# Patient Record
Sex: MALE | Race: WHITE | NOT HISPANIC OR LATINO | Employment: OTHER | URBAN - NONMETROPOLITAN AREA
[De-identification: names, ages, dates, MRNs, and addresses within clinical notes are randomized per-mention and may not be internally consistent; named-entity substitution may affect disease eponyms.]

---

## 2018-12-15 ENCOUNTER — HISTORICAL (OUTPATIENT)
Dept: ADMINISTRATIVE | Facility: HOSPITAL | Age: 68
End: 2018-12-15

## 2020-08-24 ENCOUNTER — HISTORICAL (OUTPATIENT)
Dept: ADMINISTRATIVE | Facility: HOSPITAL | Age: 70
End: 2020-08-24

## 2023-06-26 ENCOUNTER — ANESTHESIA (OUTPATIENT)
Dept: GASTROENTEROLOGY | Facility: HOSPITAL | Age: 73
End: 2023-06-26
Payer: MEDICARE

## 2023-06-26 ENCOUNTER — ANESTHESIA EVENT (OUTPATIENT)
Dept: GASTROENTEROLOGY | Facility: HOSPITAL | Age: 73
End: 2023-06-26
Payer: MEDICARE

## 2023-06-26 ENCOUNTER — HOSPITAL ENCOUNTER (OUTPATIENT)
Facility: HOSPITAL | Age: 73
Discharge: HOME | End: 2023-06-26
Attending: FAMILY MEDICINE | Admitting: FAMILY MEDICINE
Payer: MEDICARE

## 2023-06-26 VITALS
HEART RATE: 97 BPM | WEIGHT: 162.81 LBS | TEMPERATURE: 99 F | OXYGEN SATURATION: 96 % | HEIGHT: 66 IN | BODY MASS INDEX: 26.17 KG/M2 | RESPIRATION RATE: 20 BRPM | DIASTOLIC BLOOD PRESSURE: 86 MMHG | SYSTOLIC BLOOD PRESSURE: 146 MMHG
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
## 2023-06-26 DIAGNOSIS — T17.208A FOREIGN BODY IN THROAT: ICD-10-CM

## 2023-06-26 DIAGNOSIS — T18.128A ESOPHAGEAL OBSTRUCTION DUE TO FOOD IMPACTION: Primary | ICD-10-CM

## 2023-06-26 LAB
ALBUMIN SERPL-MCNC: 4.8 G/DL (ref 3.4–5)
ALBUMIN/GLOB SERPL: 1.5 RATIO
ALP SERPL-CCNC: 72 UNIT/L (ref 50–144)
ALT SERPL-CCNC: 22 UNIT/L (ref 1–45)
ANION GAP SERPL CALC-SCNC: 5 MEQ/L (ref 2–13)
AST SERPL-CCNC: 38 UNIT/L (ref 17–59)
BASOPHILS # BLD AUTO: 0.04 X10(3)/MCL (ref 0.01–0.08)
BASOPHILS NFR BLD AUTO: 0.5 % (ref 0.1–1.2)
BILIRUBIN DIRECT+TOT PNL SERPL-MCNC: 0.9 MG/DL (ref 0–1)
BUN SERPL-MCNC: 18 MG/DL (ref 7–20)
CALCIUM SERPL-MCNC: 8.6 MG/DL (ref 8.4–10.2)
CHLORIDE SERPL-SCNC: 109 MMOL/L (ref 98–110)
CO2 SERPL-SCNC: 30 MMOL/L (ref 21–32)
CREAT SERPL-MCNC: 0.88 MG/DL (ref 0.66–1.25)
CREAT/UREA NIT SERPL: 20 (ref 12–20)
EOSINOPHIL # BLD AUTO: 0.42 X10(3)/MCL (ref 0.04–0.54)
EOSINOPHIL NFR BLD AUTO: 5.5 % (ref 0.7–7)
ERYTHROCYTE [DISTWIDTH] IN BLOOD BY AUTOMATED COUNT: 12.1 %
GFR SERPLBLD CREATININE-BSD FMLA CKD-EPI: >90 MLS/MIN/1.73/M2
GLOBULIN SER-MCNC: 3.1 GM/DL (ref 2–3.9)
GLUCOSE SERPL-MCNC: 98 MG/DL (ref 70–115)
HCT VFR BLD AUTO: 44.7 % (ref 36–52)
HGB BLD-MCNC: 15.5 G/DL (ref 13–18)
IMM GRANULOCYTES # BLD AUTO: 0.01 X10(3)/MCL (ref 0–0.03)
IMM GRANULOCYTES NFR BLD AUTO: 0.1 % (ref 0–0.5)
LYMPHOCYTES # BLD AUTO: 2.12 X10(3)/MCL (ref 1.32–3.57)
LYMPHOCYTES NFR BLD AUTO: 27.5 % (ref 20–55)
MCH RBC QN AUTO: 33.4 PG (ref 27–34)
MCHC RBC AUTO-ENTMCNC: 34.7 G/DL (ref 31–37)
MCV RBC AUTO: 96.3 FL (ref 79–99)
MONOCYTES # BLD AUTO: 0.4 X10(3)/MCL (ref 0.3–0.82)
MONOCYTES NFR BLD AUTO: 5.2 % (ref 4.7–12.5)
NEUTROPHILS # BLD AUTO: 4.71 X10(3)/MCL (ref 1.78–5.38)
NEUTROPHILS NFR BLD AUTO: 61.2 % (ref 37–73)
NRBC BLD AUTO-RTO: 0 %
PLATELET # BLD AUTO: 241 X10(3)/MCL (ref 140–371)
PMV BLD AUTO: 10.8 FL (ref 9.4–12.4)
POTASSIUM SERPL-SCNC: 4 MMOL/L (ref 3.5–5.1)
PROT SERPL-MCNC: 7.9 GM/DL (ref 6.3–8.2)
RBC # BLD AUTO: 4.64 X10(6)/MCL (ref 4–6)
SODIUM SERPL-SCNC: 144 MMOL/L (ref 135–145)
WBC # SPEC AUTO: 7.7 X10(3)/MCL (ref 4–11.5)

## 2023-06-26 PROCEDURE — D9220A PRA ANESTHESIA: Mod: ,,, | Performed by: NURSE ANESTHETIST, CERTIFIED REGISTERED

## 2023-06-26 PROCEDURE — G0378 HOSPITAL OBSERVATION PER HR: HCPCS

## 2023-06-26 PROCEDURE — 25000003 PHARM REV CODE 250: Performed by: SURGERY

## 2023-06-26 PROCEDURE — 99285 EMERGENCY DEPT VISIT HI MDM: CPT | Mod: 25

## 2023-06-26 PROCEDURE — C9113 INJ PANTOPRAZOLE SODIUM, VIA: HCPCS | Performed by: FAMILY MEDICINE

## 2023-06-26 PROCEDURE — 43239 EGD BIOPSY SINGLE/MULTIPLE: CPT | Performed by: SURGERY

## 2023-06-26 PROCEDURE — 25000003 PHARM REV CODE 250: Performed by: NURSE ANESTHETIST, CERTIFIED REGISTERED

## 2023-06-26 PROCEDURE — 25000003 PHARM REV CODE 250: Performed by: FAMILY MEDICINE

## 2023-06-26 PROCEDURE — 80053 COMPREHEN METABOLIC PANEL: CPT | Performed by: FAMILY MEDICINE

## 2023-06-26 PROCEDURE — 63600175 PHARM REV CODE 636 W HCPCS: Performed by: FAMILY MEDICINE

## 2023-06-26 PROCEDURE — 63600175 PHARM REV CODE 636 W HCPCS: Performed by: SURGERY

## 2023-06-26 PROCEDURE — 96374 THER/PROPH/DIAG INJ IV PUSH: CPT | Mod: 59

## 2023-06-26 PROCEDURE — 36415 COLL VENOUS BLD VENIPUNCTURE: CPT | Performed by: FAMILY MEDICINE

## 2023-06-26 PROCEDURE — D9220A PRA ANESTHESIA: ICD-10-PCS | Mod: ,,, | Performed by: NURSE ANESTHETIST, CERTIFIED REGISTERED

## 2023-06-26 PROCEDURE — 63600175 PHARM REV CODE 636 W HCPCS: Performed by: NURSE ANESTHETIST, CERTIFIED REGISTERED

## 2023-06-26 PROCEDURE — 99900035 HC TECH TIME PER 15 MIN (STAT)

## 2023-06-26 PROCEDURE — 85025 COMPLETE CBC W/AUTO DIFF WBC: CPT | Performed by: FAMILY MEDICINE

## 2023-06-26 RX ORDER — SODIUM CHLORIDE 9 MG/ML
INJECTION, SOLUTION INTRAVENOUS CONTINUOUS
Status: DISCONTINUED | OUTPATIENT
Start: 2023-06-26 | End: 2023-06-26 | Stop reason: HOSPADM

## 2023-06-26 RX ORDER — PANTOPRAZOLE SODIUM 40 MG/10ML
40 INJECTION, POWDER, LYOPHILIZED, FOR SOLUTION INTRAVENOUS DAILY
Status: DISCONTINUED | OUTPATIENT
Start: 2023-06-26 | End: 2023-06-26

## 2023-06-26 RX ORDER — ONDANSETRON 2 MG/ML
INJECTION INTRAMUSCULAR; INTRAVENOUS
Status: DISCONTINUED | OUTPATIENT
Start: 2023-06-26 | End: 2023-06-26 | Stop reason: HOSPADM

## 2023-06-26 RX ORDER — ONDANSETRON 2 MG/ML
4 INJECTION INTRAMUSCULAR; INTRAVENOUS EVERY 12 HOURS PRN
Status: DISCONTINUED | OUTPATIENT
Start: 2023-06-26 | End: 2023-06-26 | Stop reason: HOSPADM

## 2023-06-26 RX ORDER — HYDROMORPHONE HYDROCHLORIDE 1 MG/ML
0.5 INJECTION, SOLUTION INTRAMUSCULAR; INTRAVENOUS; SUBCUTANEOUS EVERY 6 HOURS PRN
Status: DISCONTINUED | OUTPATIENT
Start: 2023-06-26 | End: 2023-06-26 | Stop reason: HOSPADM

## 2023-06-26 RX ORDER — LIDOCAINE HYDROCHLORIDE 20 MG/ML
INJECTION INTRAVENOUS
Status: DISCONTINUED | OUTPATIENT
Start: 2023-06-26 | End: 2023-06-26 | Stop reason: HOSPADM

## 2023-06-26 RX ORDER — MIDAZOLAM HYDROCHLORIDE 1 MG/ML
2 INJECTION INTRAMUSCULAR; INTRAVENOUS
Status: COMPLETED | OUTPATIENT
Start: 2023-06-26 | End: 2023-06-26

## 2023-06-26 RX ORDER — DEXAMETHASONE SODIUM PHOSPHATE 4 MG/ML
INJECTION, SOLUTION INTRA-ARTICULAR; INTRALESIONAL; INTRAMUSCULAR; INTRAVENOUS; SOFT TISSUE
Status: DISCONTINUED | OUTPATIENT
Start: 2023-06-26 | End: 2023-06-26 | Stop reason: HOSPADM

## 2023-06-26 RX ORDER — PROPOFOL 10 MG/ML
VIAL (ML) INTRAVENOUS
Status: DISCONTINUED | OUTPATIENT
Start: 2023-06-26 | End: 2023-06-26 | Stop reason: HOSPADM

## 2023-06-26 RX ORDER — VECURONIUM BROMIDE FOR INJECTION 1 MG/ML
INJECTION, POWDER, LYOPHILIZED, FOR SOLUTION INTRAVENOUS
Status: DISCONTINUED | OUTPATIENT
Start: 2023-06-26 | End: 2023-06-26 | Stop reason: HOSPADM

## 2023-06-26 RX ORDER — ONDANSETRON 2 MG/ML
4 INJECTION INTRAMUSCULAR; INTRAVENOUS EVERY 6 HOURS PRN
Status: DISCONTINUED | OUTPATIENT
Start: 2023-06-26 | End: 2023-06-26

## 2023-06-26 RX ORDER — FENTANYL CITRATE 50 UG/ML
INJECTION, SOLUTION INTRAMUSCULAR; INTRAVENOUS
Status: DISCONTINUED | OUTPATIENT
Start: 2023-06-26 | End: 2023-06-26 | Stop reason: HOSPADM

## 2023-06-26 RX ORDER — PANTOPRAZOLE SODIUM 40 MG/10ML
40 INJECTION, POWDER, LYOPHILIZED, FOR SOLUTION INTRAVENOUS DAILY
Status: DISCONTINUED | OUTPATIENT
Start: 2023-06-26 | End: 2023-06-26 | Stop reason: HOSPADM

## 2023-06-26 RX ORDER — PREDNISOLONE ACETATE 10 MG/ML
1 SUSPENSION/ DROPS OPHTHALMIC EVERY 4 HOURS
Status: DISCONTINUED | OUTPATIENT
Start: 2023-06-26 | End: 2023-06-26 | Stop reason: HOSPADM

## 2023-06-26 RX ADMIN — FENTANYL CITRATE 100 MCG: 50 INJECTION, SOLUTION INTRAMUSCULAR; INTRAVENOUS at 07:06

## 2023-06-26 RX ADMIN — FENTANYL CITRATE 50 MCG: 50 INJECTION, SOLUTION INTRAMUSCULAR; INTRAVENOUS at 07:06

## 2023-06-26 RX ADMIN — SUGAMMADEX 200 MG: 100 INJECTION, SOLUTION INTRAVENOUS at 08:06

## 2023-06-26 RX ADMIN — GLYCOPYRROLATE 0.2 MG: 0.2 INJECTION, SOLUTION INTRAMUSCULAR; INTRAVITREAL at 07:06

## 2023-06-26 RX ADMIN — PROPOFOL 150 MG: 10 INJECTION, EMULSION INTRAVENOUS at 07:06

## 2023-06-26 RX ADMIN — PANTOPRAZOLE SODIUM 40 MG: 40 INJECTION, POWDER, FOR SOLUTION INTRAVENOUS at 01:06

## 2023-06-26 RX ADMIN — PREDNISOLONE ACETATE 1 DROP: 10 SUSPENSION/ DROPS OPHTHALMIC at 08:06

## 2023-06-26 RX ADMIN — PREDNISOLONE ACETATE 1 DROP: 10 SUSPENSION/ DROPS OPHTHALMIC at 05:06

## 2023-06-26 RX ADMIN — DEXAMETHASONE SODIUM PHOSPHATE 8 MG: 4 INJECTION, SOLUTION INTRA-ARTICULAR; INTRALESIONAL; INTRAMUSCULAR; INTRAVENOUS; SOFT TISSUE at 08:06

## 2023-06-26 RX ADMIN — LIDOCAINE HYDROCHLORIDE 75 MG: 20 INJECTION, SOLUTION INTRAVENOUS at 07:06

## 2023-06-26 RX ADMIN — SODIUM CHLORIDE: 9 INJECTION, SOLUTION INTRAVENOUS at 09:06

## 2023-06-26 RX ADMIN — MIDAZOLAM HYDROCHLORIDE 2 MG: 1 INJECTION, SOLUTION INTRAMUSCULAR; INTRAVENOUS at 07:06

## 2023-06-26 RX ADMIN — VECURONIUM BROMIDE 5 MG: 1 INJECTION, POWDER, LYOPHILIZED, FOR SOLUTION INTRAVENOUS at 07:06

## 2023-06-26 RX ADMIN — SODIUM CHLORIDE: 9 INJECTION, SOLUTION INTRAVENOUS at 01:06

## 2023-06-26 RX ADMIN — ONDANSETRON 4 MG: 2 INJECTION INTRAMUSCULAR; INTRAVENOUS at 07:06

## 2023-06-26 NOTE — SUBJECTIVE & OBJECTIVE
Past Medical History:   Diagnosis Date    Gastro-esophageal reflux disease without esophagitis     Unspecified cataract     Unspecified glaucoma        Past Surgical History:   Procedure Laterality Date    APPENDECTOMY      eye shunts Left     EYE SURGERY Left     x4    PENETRATING KERATOPLASTY Left 2022    Procedure: KERATOPLASTY, PENETRATING;  Surgeon: Alexis Gaytan MD;  Location: [facility];  Service: Ophthalmology;  Laterality: Left;    TRANSPLANT, PARTIAL-THICKNESS, CORNEA, USING DSAEK TECHNIQUE         Review of patient's allergies indicates:  No Known Allergies    No current facility-administered medications on file prior to encounter.     Current Outpatient Medications on File Prior to Encounter   Medication Sig    pantoprazole (PROTONIX) 40 MG tablet Take 40 mg by mouth once daily.    prednisoLONE acetate (PRED FORTE) 1 % DrpS SMARTSI Drop(s) Left Eye 6 Times Daily    [DISCONTINUED] dorzolamide-timolol 2-0.5% (COSOPT) 22.3-6.8 mg/mL ophthalmic solution Place 1 drop into the left eye 2 (two) times daily.    [DISCONTINUED] neomycin-polymyxin-dexamethasone (DEXACINE) 3.5 mg/g-10,000 unit/g-0.1 % Oint     [DISCONTINUED] ofloxacin (OCUFLOX) 0.3 % ophthalmic solution Place 1 drop into the left eye 4 (four) times daily.     Family History    None       Tobacco Use    Smoking status: Never    Smokeless tobacco: Current     Types: Chew   Substance and Sexual Activity    Alcohol use: Yes     Alcohol/week: 24.0 standard drinks     Types: 24 Cans of beer per week    Drug use: Never    Sexual activity: Yes     Review of Systems   Constitutional:  Negative for activity change, appetite change, fatigue and fever.   HENT:  Positive for drooling, sore throat and trouble swallowing. Negative for congestion, ear pain, nosebleeds, sinus pressure and tinnitus.    Eyes:  Negative for pain and itching.   Respiratory:  Positive for chest tightness. Negative for apnea, cough, choking, shortness of breath and wheezing.     Cardiovascular:  Negative for chest pain and leg swelling.   Gastrointestinal:  Negative for abdominal distention, abdominal pain, constipation, diarrhea, nausea and vomiting.   Endocrine: Negative for cold intolerance and heat intolerance.   Genitourinary:  Negative for dysuria, enuresis, frequency, penile swelling, scrotal swelling, testicular pain and urgency.   Musculoskeletal:  Negative for arthralgias, back pain, gait problem, joint swelling, myalgias and neck pain.   Skin:  Negative for color change, pallor, rash and wound.   Allergic/Immunologic: Negative for environmental allergies and food allergies.   Neurological:  Negative for dizziness, tremors, seizures, syncope, numbness and headaches.   Psychiatric/Behavioral:  Negative for agitation, behavioral problems, dysphoric mood, hallucinations, self-injury and suicidal ideas. The patient is not nervous/anxious and is not hyperactive.    Objective:     Vital Signs (Most Recent):  Temp: 97.1 °F (36.2 °C) (06/26/23 1507)  Pulse: 64 (06/26/23 1507)  Resp: 19 (06/26/23 1507)  BP: (!) 142/76 (06/26/23 1507)  SpO2: 98 % (06/26/23 1507) Vital Signs (24h Range):  Temp:  [97.1 °F (36.2 °C)-98.4 °F (36.9 °C)] 97.1 °F (36.2 °C)  Pulse:  [64-81] 64  Resp:  [16-19] 19  SpO2:  [95 %-99 %] 98 %  BP: (137-152)/(76-91) 142/76     Weight: 73.8 kg (162 lb 12.8 oz)  Body mass index is 26.28 kg/m².     Physical Exam  Constitutional:       General: He is not in acute distress.     Appearance: Normal appearance. He is normal weight. He is not ill-appearing.   HENT:      Head: Normocephalic and atraumatic.      Nose: Nose normal.   Eyes:      General: No scleral icterus.     Conjunctiva/sclera: Conjunctivae normal.   Cardiovascular:      Rate and Rhythm: Normal rate and regular rhythm.      Pulses: Normal pulses.      Heart sounds: Normal heart sounds.   Pulmonary:      Effort: Pulmonary effort is normal.      Breath sounds: Normal breath sounds.   Abdominal:      General:  Abdomen is flat. Bowel sounds are normal. There is no distension.      Palpations: Abdomen is soft. There is no mass.      Tenderness: There is no abdominal tenderness.   Musculoskeletal:         General: No swelling or tenderness.      Right lower leg: No edema.      Left lower leg: No edema.   Skin:     General: Skin is warm and dry.      Findings: No erythema or rash.   Neurological:      General: No focal deficit present.      Mental Status: He is alert and oriented to person, place, and time.   Psychiatric:         Mood and Affect: Mood normal.         Behavior: Behavior normal.         Thought Content: Thought content normal.              Significant Labs: All pertinent labs within the past 24 hours have been reviewed.  BMP:   Recent Labs   Lab 06/26/23  1258      K 4.0   CO2 30   BUN 18.0   CREATININE 0.88   CALCIUM 8.6     CBC:   Recent Labs   Lab 06/26/23  1253   WBC 7.70   HGB 15.5   HCT 44.7          Significant Imaging: I have reviewed all pertinent imaging results/findings within the past 24 hours.

## 2023-06-26 NOTE — ED PROVIDER NOTES
Encounter Date: 6/26/2023       History     Chief Complaint   Patient presents with    Dysphagia     Patient states he thinks something is stuck in his esophagus. Patient states he feels it in his chest and has a history of having food lodged in esophagus. NADN, VS stable     Patient presents to the emergency room with a food bolus stuck in his throat since last night.  He was eating steak, and could not swallow.  He has been unable to eat or drink without vomiting since then.  He states the same thing happened about 15 years ago, he had a scope dislodge the food bolus and had an esophageal dilation.    The history is provided by the patient.   Review of patient's allergies indicates:  No Known Allergies  Past Medical History:   Diagnosis Date    Gastro-esophageal reflux disease without esophagitis     Unspecified cataract     Unspecified glaucoma      Past Surgical History:   Procedure Laterality Date    APPENDECTOMY      eye shunts Left     EYE SURGERY Left     x4    PENETRATING KERATOPLASTY Left 11/22/2022    Procedure: KERATOPLASTY, PENETRATING;  Surgeon: Alexis Gaytan MD;  Location: [facility] OR;  Service: Ophthalmology;  Laterality: Left;    TRANSPLANT, PARTIAL-THICKNESS, CORNEA, USING DSAEK TECHNIQUE       History reviewed. No pertinent family history.  Social History     Tobacco Use    Smoking status: Never    Smokeless tobacco: Current     Types: Chew   Substance Use Topics    Drug use: Never     Review of Systems   Constitutional:  Negative for fever.   HENT:  Negative for sore throat.    Respiratory:  Negative for shortness of breath.    Cardiovascular:  Negative for chest pain.   Gastrointestinal:  Negative for nausea.   Genitourinary:  Negative for dysuria.   Musculoskeletal:  Negative for back pain.   Skin:  Negative for rash.   Neurological:  Negative for weakness.   Hematological:  Does not bruise/bleed easily.   All other systems reviewed and are negative.    Physical Exam     Initial Vitals [06/26/23  1038]   BP Pulse Resp Temp SpO2   (!) 152/77 80 16 98.3 °F (36.8 °C) 98 %      MAP       --         Physical Exam    Nursing note and vitals reviewed.  Constitutional: Vital signs are normal. He appears well-developed and well-nourished. He is cooperative.  Non-toxic appearance. He does not appear ill.   HENT:   Head: Normocephalic and atraumatic.   Eyes: Conjunctivae and lids are normal.   Neck: Trachea normal. Neck supple.   Cardiovascular:  Normal rate and regular rhythm.  No extrasystoles are present.          Pulmonary/Chest: Breath sounds normal.   Abdominal: Abdomen is soft. There is no abdominal tenderness.   Musculoskeletal:         General: Normal range of motion.      Cervical back: Neck supple.     Neurological: He is alert and oriented to person, place, and time. He has normal strength. No cranial nerve deficit or sensory deficit. He displays a negative Romberg sign.   Skin: Skin is warm, dry and intact. Capillary refill takes less than 2 seconds.   Psychiatric: He has a normal mood and affect. His speech is normal and behavior is normal. He is not actively hallucinating. He is attentive.       ED Course   Procedures  Labs Reviewed - No data to display       Imaging Results              X-Ray Neck Soft Tissue (Final result)  Result time 06/26/23 11:23:12      Final result by Mary Herron III, MD (06/26/23 11:23:12)                   Impression:      1. The airway appears adequately patent and unremarkable.  See above comments.      Electronically signed by: Mary Herron  Date:    06/26/2023  Time:    11:23               Narrative:    EXAMINATION:  STUDY: XR NECK SOFT TISSUE    CLINICAL HISTORY AND TECHNIQUE:  RT Oscar on 6/26/2023 11:02 AM    CURRENT CLINICAL HISTORY: ER PT    X 1 DAY    -DYSPHAGIA, FEELS LIKE FOOD IS LODGED IN THROAT (MID ESOPHAGUS)    PAST MEDICAL HISTORY: N/A    TECHNIQUE: 2 VIEWS OF SOFT TISSUE NECK    TECHNOLOGIST: LISA/MD    TRANSPORT  OK    COMPARISON:  None    FINDINGS:  Two views of the neck using soft tissue detail were performed.  The airway appears adequately patent with no obvious radiopaque foreign bodies, intrinsic masses, compression or displaced by extrinsic masses or other significant abnormalities.  The epiglottis appears thin and unremarkable.  Mild-to-moderate degenerative changes are noted involving the mid and lower cervical spine.                                       Medications - No data to display  Medical Decision Making:   ED Management:  X-rays of soft tissue neck shows no obvious foreign body.    Discussed with Dr. Israel, admit the patient to hospitalist, keep NPO.                        Clinical Impression:   Final diagnoses:  [T17.208A] Foreign body in throat (Primary)        ED Disposition Condition    Observation Stable                Newton Hodge MD  06/26/23 4200

## 2023-06-26 NOTE — HPI
Dysphagia       Patient states he thinks something is stuck in his esophagus. Patient states he feels it in his chest and has a history of having food lodged in esophagus. NADN, VS stable      Patient presents to the emergency room with a food bolus stuck in his throat since last night.  He was eating steak, and could not swallow.  He has been unable to eat or drink without vomiting since then.  He states the same thing happened about 15 years ago, he had a scope dislodge the food bolus and had an esophageal dilation.     The history is provided by the patient.   Review of patient's allergies indicates:  No Known Allergies    Spoke with ERMD assessment as above, he has been drooling and unable to swallow his own spit since last night.  He is not SOB or in any distress.  Surgery consulted and recommended admit for observation.

## 2023-06-26 NOTE — ANESTHESIA PREPROCEDURE EVALUATION
06/26/2023  Dion Powers is a 72 y.o., male.      Pre-op Assessment    I have reviewed the Patient Summary Reports.     I have reviewed the Nursing Notes. I have reviewed the NPO Status.   I have reviewed the Medications.     Review of Systems  Anesthesia Hx:  No problems with previous Anesthesia  Denies Family Hx of Anesthesia complications.   Denies Personal Hx of Anesthesia complications.   Social:  Alcohol Use smokeless tobacco   Hematology/Oncology:  Hematology Normal   Oncology Normal     EENT/Dental:  EENT/Dental Normal glaucoma   Cardiovascular:  Cardiovascular Normal Exercise tolerance: good     Pulmonary:  Pulmonary Normal    Renal/:  Renal/ Normal     Hepatic/GI:   GERD    Musculoskeletal:  Musculoskeletal Normal    Neurological:  Neurology Normal    Endocrine:  Endocrine Normal    Dermatological:  Skin Normal    Psych:  Psychiatric Normal           Physical Exam  General: Well nourished, Cooperative, Alert and Oriented    Airway:  Mallampati: II / II  Mouth Opening: Normal  TM Distance: Normal  Tongue: Normal  Neck ROM: Normal ROM    Dental:  Intact        Anesthesia Plan  Type of Anesthesia, risks & benefits discussed:    Anesthesia Type: Gen ETT  Intra-op Monitoring Plan: Standard ASA Monitors  Post Op Pain Control Plan: multimodal analgesia  Induction:  IV  Airway Plan: Direct  Informed Consent: Informed consent signed with the Patient and all parties understand the risks and agree with anesthesia plan.  All questions answered. Patient consented to blood products? Yes  ASA Score: 2  Day of Surgery Review of History & Physical: H&P Update referred to the surgeon/provider.I have interviewed and examined the patient. I have reviewed the patient's H&P dated: There are no significant changes.     Ready For Surgery From Anesthesia Perspective.     .

## 2023-06-26 NOTE — CONSULTS
Patient is a 72-yr-old male that presented to the Emergency Room with a food bolus stuck in his throat since last night.  He was eating steak, and could not swallow.  He has been unable to eat or drink without vomiting since then. He states that the same thing happened about 15 yrs ago, and he had a scope dislodge the food bolus and had an esophageal dilation.     Review of the patients' allergies indicates.   No known Allergies    Past Medical History:  Diagnosis  Gastro-esophageal reflux disease without esophagitis  Unspecified cataracts in Left Eye(Removal with corneal transplant)  Unspecified Glaucoma in Left Eye    Past Surgical History  Procedure  Appendectomy   Eye Shunts(Left)  Eye Surgery(Left)(2 Corneal transplants)  Penetrating Keratoplasty(Left)  (2022)  Dr. Alexis Gaytan  Transplant, Partial-Thickness, cornea, using Dsaek Technique (2023)  6.   Colonoscopy  X 3 (Dr. Lewis, )  7.    Esophageal Dilation ()  8.   EGD X 3  9.   No Angiogram  10. No Stress Test  11. No Echocardiogram     Immunizations:  Covid 19 vaccine 2021, 2021, 2021  Tdap vaccine on 2015  No Shingles vaccine  No Hepatitis vaccine  No Pneumonia vaccine  No Flu vaccine    Family History  Mother is alive and in her 90+ and reside in Nursing Home with Dementia  Father is  in  from Lung Cancer, positive for Cig Smoker    Social History  Former Smoker( 3 paks per day at 20 yrs, quit at 25 yrs old)  Positive for Non Smokeless Tobacco- 1 can every other day for 30 yrs  No Drug Use  No detention Time   service, [employer] 4 yrs. Honorable Discharge    Tour in [facility] and [place] and [place]  No Rehab Time   for 53 yrs   Never   Children 4 (3 boys and 1 girl)   1 Girl No Occupation   1 Boy Teacher   1 Boy Teacher   1 Boy   10. Patient was a [employer] .  11. Education Level High School Graduate  12. Wife (Megan)  "worked as a Sitter for Children  13. Resides in [place]  14. Positive for Glasses  15. PCP is Dr. Lewis    Review of Systems  Patient is positive for Dysphagia    Objective  Vital Signs  06/26/2023  10:38  BP is 152/77  Pulse is 80  Respirations is 16  Temp is 98.3F (36.8 C)  SpO2 is 98%  Height is 5' 6" (167.6cm)  Weight is 73.8 kg(162 lb 12.8 oz)  Body Mass Index is 26.28 kg/m2    Physical Exam  Patient appears well-developed and well-nourished. He is cooperative. Does not appear ill.   Head is atraumatic  Conjunctivae and lids are normal. Positive for glasses  Neck is supple  Heart rate and rhythm is normal.   Lung sounds are normal  Abdomen is soft with no abdominal tenderness  Normal range of motion and neck supple  Patient is alert and oriented to person, place and time. He displays a negative Romberg sign.   Skin is warm and dry and intact.   Mood and affect are normal. Patient is attentive, with normal speech and normal behavior.     Laboratory Findings  CBC   06/26/2023   12:53  7.70>    15.5 / 44.7   241<     96.3 /33.4     CMP    06/26/2023  12:53    144/ 4.0    109/ 30    18.0 / 0.88     <98      8.6 /      Xray Neck Soft Tissue  Impression  The airway appears adequately patent and unremarkable.   No obvious radiopaque foreign bodies, intrinsic masses, compression or displaced by extrinsic  masses or other significant abnormalities. The epiglottis appears thin and unremarkable. Mild to moderate degenerative changes are noted involving the mid and lower cervical spine.     Assessment  Patient is a 72-yr-old male that presented to the Emergency Room with a food bolus stuck in his throat since last night.  He was eating steak, and could not swallow.  He has been unable to eat or drink without vomiting since then. He states that the same thing happened about 15 yrs ago, and he had a scope dislodge the food bolus and had an esophageal dilation.     Plan:  EGD with removal of foreign body  "

## 2023-06-26 NOTE — Clinical Note
Diagnosis: Foreign body in throat [[id number]]   Future Attending Provider: MENDEZ GRANADOS [[id number]]   Admitting Provider:: MENDEZ GRANADOS [[id number]]

## 2023-06-26 NOTE — PLAN OF CARE
Problem: Adult Inpatient Plan of Care  Goal: Plan of Care Review  Outcome: Ongoing, Progressing  Goal: Patient-Specific Goal (Individualized)  Outcome: Ongoing, Progressing  Goal: Absence of Hospital-Acquired Illness or Injury  Outcome: Ongoing, Progressing  Goal: Optimal Comfort and Wellbeing  Outcome: Ongoing, Progressing  Goal: Readiness for Transition of Care  Outcome: Ongoing, Progressing     Problem: Pain Acute  Goal: Acceptable Pain Control and Functional Ability  Outcome: Ongoing, Progressing     Problem: Eating/Swallowing Impairment  Goal: Optimal Eating/Swallowing without Aspir  Outcome: Ongoing, Progressing     Problem: Fall Injury Risk  Goal: Absence of Fall and Fall-Related Injury  Outcome: Ongoing, Progressing   Reviewed with patient

## 2023-06-26 NOTE — H&P
[facility] Medicine  History & Physical    Patient Name: Dion Powers  MRN: [id number]  Patient Class: OP- Observation  Admission Date: 6/26/2023  Attending Physician: Ramila Izaguirre MD   Primary Care Provider: Arnol Lewis MD         Patient information was obtained from patient and ER records.     Subjective:     Principal Problem:<principal problem not specified>    Chief Complaint:   Chief Complaint   Patient presents with    Dysphagia     Patient states he thinks something is stuck in his esophagus. Patient states he feels it in his chest and has a history of having food lodged in esophagus. NADN, VS stable        HPI:    Dysphagia       Patient states he thinks something is stuck in his esophagus. Patient states he feels it in his chest and has a history of having food lodged in esophagus. NADN, VS stable      Patient presents to the emergency room with a food bolus stuck in his throat since last night.  He was eating steak, and could not swallow.  He has been unable to eat or drink without vomiting since then.  He states the same thing happened about 15 years ago, he had a scope dislodge the food bolus and had an esophageal dilation.     The history is provided by the patient.   Review of patient's allergies indicates:  No Known Allergies    Spoke with ERMD assessment as above, he has been drooling and unable to swallow his own spit since last night.  He is not SOB or in any distress.  Surgery consulted and recommended admit for observation.        Past Medical History:   Diagnosis Date    Gastro-esophageal reflux disease without esophagitis     Unspecified cataract     Unspecified glaucoma        Past Surgical History:   Procedure Laterality Date    APPENDECTOMY      eye shunts Left     EYE SURGERY Left     x4    PENETRATING KERATOPLASTY Left 11/22/2022    Procedure: KERATOPLASTY, PENETRATING;  Surgeon: Alexis Gaytan MD;  [facility]: [facility];  Service:  Ophthalmology;  Laterality: Left;    TRANSPLANT, PARTIAL-THICKNESS, CORNEA, USING DSAEK TECHNIQUE         Review of patient's allergies indicates:  No Known Allergies    No current facility-administered medications on file prior to encounter.     Current Outpatient Medications on File Prior to Encounter   Medication Sig    pantoprazole (PROTONIX) 40 MG tablet Take 40 mg by mouth once daily.    prednisoLONE acetate (PRED FORTE) 1 % DrpS SMARTSI Drop(s) Left Eye 6 Times Daily    [DISCONTINUED] dorzolamide-timolol 2-0.5% (COSOPT) 22.3-6.8 mg/mL ophthalmic solution Place 1 drop into the left eye 2 (two) times daily.    [DISCONTINUED] neomycin-polymyxin-dexamethasone (DEXACINE) 3.5 mg/g-10,000 unit/g-0.1 % Oint     [DISCONTINUED] ofloxacin (OCUFLOX) 0.3 % ophthalmic solution Place 1 drop into the left eye 4 (four) times daily.     Family History    None       Tobacco Use    Smoking status: Never    Smokeless tobacco: Current     Types: Chew   Substance and Sexual Activity    Alcohol use: Yes     Alcohol/week: 24.0 standard drinks     Types: 24 Cans of beer per week    Drug use: Never    Sexual activity: Yes     Review of Systems   Constitutional:  Negative for activity change, appetite change, fatigue and fever.   HENT:  Positive for drooling, sore throat and trouble swallowing. Negative for congestion, ear pain, nosebleeds, sinus pressure and tinnitus.    Eyes:  Negative for pain and itching.   Respiratory:  Positive for chest tightness. Negative for apnea, cough, choking, shortness of breath and wheezing.    Cardiovascular:  Negative for chest pain and leg swelling.   Gastrointestinal:  Negative for abdominal distention, abdominal pain, constipation, diarrhea, nausea and vomiting.   Endocrine: Negative for cold intolerance and heat intolerance.   Genitourinary:  Negative for dysuria, enuresis, frequency, penile swelling, scrotal swelling, testicular pain and urgency.   Musculoskeletal:  Negative for  arthralgias, back pain, gait problem, joint swelling, myalgias and neck pain.   Skin:  Negative for color change, pallor, rash and wound.   Allergic/Immunologic: Negative for environmental allergies and food allergies.   Neurological:  Negative for dizziness, tremors, seizures, syncope, numbness and headaches.   Psychiatric/Behavioral:  Negative for agitation, behavioral problems, dysphoric mood, hallucinations, self-injury and suicidal ideas. The patient is not nervous/anxious and is not hyperactive.    Objective:     Vital Signs (Most Recent):  Temp: 97.1 °F (36.2 °C) (06/26/23 1507)  Pulse: 64 (06/26/23 1507)  Resp: 19 (06/26/23 1507)  BP: (!) 142/76 (06/26/23 1507)  SpO2: 98 % (06/26/23 1507) Vital Signs (24h Range):  Temp:  [97.1 °F (36.2 °C)-98.4 °F (36.9 °C)] 97.1 °F (36.2 °C)  Pulse:  [64-81] 64  Resp:  [16-19] 19  SpO2:  [95 %-99 %] 98 %  BP: (137-152)/(76-91) 142/76     Weight: 73.8 kg (162 lb 12.8 oz)  Body mass index is 26.28 kg/m².     Physical Exam  Constitutional:       General: He is not in acute distress.     Appearance: Normal appearance. He is normal weight. He is not ill-appearing.   HENT:      Head: Normocephalic and atraumatic.      Nose: Nose normal.   Eyes:      General: No scleral icterus.     Conjunctiva/sclera: Conjunctivae normal.   Cardiovascular:      Rate and Rhythm: Normal rate and regular rhythm.      Pulses: Normal pulses.      Heart sounds: Normal heart sounds.   Pulmonary:      Effort: Pulmonary effort is normal.      Breath sounds: Normal breath sounds.   Abdominal:      General: Abdomen is flat. Bowel sounds are normal. There is no distension.      Palpations: Abdomen is soft. There is no mass.      Tenderness: There is no abdominal tenderness.   Musculoskeletal:         General: No swelling or tenderness.      Right lower leg: No edema.      Left lower leg: No edema.   Skin:     General: Skin is warm and dry.      Findings: No erythema or rash.   Neurological:      General: No  focal deficit present.      Mental Status: He is alert and oriented to person, place, and time.   Psychiatric:         Mood and Affect: Mood normal.         Behavior: Behavior normal.         Thought Content: Thought content normal.              Significant Labs: All pertinent labs within the past 24 hours have been reviewed.  BMP:   Recent Labs   Lab 06/26/23  1258      K 4.0   CO2 30   BUN 18.0   CREATININE 0.88   CALCIUM 8.6     CBC:   Recent Labs   Lab 06/26/23  1253   WBC 7.70   HGB 15.5   HCT 44.7          Significant Imaging: I have reviewed all pertinent imaging results/findings within the past 24 hours.    Assessment/Plan:     Esophageal obstruction due to food impaction  Admit for ivf  Observation  Surgery consulted  Give protonix iv        VTE Risk Mitigation (From admission, onward)         Ordered     IP VTE HIGH RISK PATIENT  Once         06/26/23 1233     Place sequential compression device  Until discontinued         06/26/23 1233                   On 06/26/2023, patient should be placed in hospital observation services under my care.        Ramila Izaguirre MD  Department of Hospital Medicine  [facility]/Surg

## 2023-06-27 NOTE — PLAN OF CARE
Patient resting quietly. No reports of pain or nausea. Vital signs stable. Meets criteria for transfer.

## 2023-06-27 NOTE — ANESTHESIA PROCEDURE NOTES
Intubation    Date/Time: 6/26/2023 8:01 PM  Performed by: Hay Berrios CRNA  Authorized by: Hay Berrios CRNA     Intubation:     Induction:  Intravenous    Intubated:  Postinduction    Mask Ventilation:  Easy mask    Attempts:  1    Attempted By:  CRNA    Method of Intubation:  Direct    Blade:  Morfin 3    Laryngeal View Grade: Grade I - full view of cords      Difficult Airway Encountered?: No      Complications:  None    Airway Device:  Oral endotracheal tube    Airway Device Size:  7.0    Style/Cuff Inflation:  Cuffed    Inflation Amount (mL):  5    Tube secured:  21    Secured at:  The lips    Placement Verified By:  Capnometry    Complicating Factors:  None    Findings Post-Intubation:  BS equal bilateral and atraumatic/condition of teeth unchanged

## 2023-06-27 NOTE — ANESTHESIA POSTPROCEDURE EVALUATION
Anesthesia Post Evaluation    Patient: Dion Powers    Procedure(s) Performed: * No procedures listed *    Final Anesthesia Type: general      Patient location during evaluation: PACU  Patient participation: Yes- Able to Participate  Level of consciousness: awake and alert, awake and oriented  Post-procedure vital signs: reviewed and stable  Pain management: adequate  Airway patency: patent    PONV status at discharge: No PONV  Anesthetic complications: no      Cardiovascular status: blood pressure returned to baseline  Respiratory status: unassisted, room air and spontaneous ventilation  Hydration status: euvolemic  Follow-up not needed.          Vitals Value Taken Time   /83 06/26/23 2022   Temp 36.4 °C (97.6 °F) 06/26/23 2020   Pulse 81 06/26/23 2023   Resp 20 06/26/23 2020   SpO2 99 % 06/26/23 2023   Vitals shown include unvalidated device data.      No case tracking events are documented in the log.      Pain/Shaun Score: Shaun Score: 9 (6/26/2023  8:20 PM)

## 2023-06-28 NOTE — ADDENDUM NOTE
Addendum  created 06/28/23 0721 by Hay Berrios CRNA    Intraprocedure Event edited, Intraprocedure Staff edited

## 2023-07-03 NOTE — DISCHARGE SUMMARY
[facility]/Surg  Discharge Note  Short Stay    * No surgery found *      OUTCOME: Patient tolerated treatment/procedure well without complication and is now ready for discharge.    DISPOSITION: Home or Self Care    FINAL DIAGNOSIS:  Foreign body in throat    FOLLOWUP: In clinic    DISCHARGE INSTRUCTIONS:    Discharge Procedure Orders   Diet general        TIME SPENT ON DISCHARGE: 5 minutes

## 2023-08-05 NOTE — DISCHARGE SUMMARY
[facility]/Surg  Hospital Medicine  Discharge Summary      Patient Name: Dion Powesr  MRN: [id number]  JOVANY: [id number]  Patient Class: OP- Observation  Admission Date: 6/26/2023  Hospital Length of Stay: 0 days  Discharge Date and Time: 6/26/2023  9:54 PM  Attending Physician: No att. providers found   Discharging Provider: Ramila Izaguirre MD  Primary Care Provider: Arnol Lewis MD    Primary Care Team: Networked reference to record PCT     HPI:    Dysphagia       Patient states he thinks something is stuck in his esophagus. Patient states he feels it in his chest and has a history of having food lodged in esophagus. NADN, VS stable      Patient presents to the emergency room with a food bolus stuck in his throat since last night.  He was eating steak, and could not swallow.  He has been unable to eat or drink without vomiting since then.  He states the same thing happened about 15 years ago, he had a scope dislodge the food bolus and had an esophageal dilation.     The history is provided by the patient.   Review of patient's allergies indicates:  No Known Allergies    Spoke with ERMD assessment as above, he has been drooling and unable to swallow his own spit since last night.  He is not SOB or in any distress.  Surgery consulted and recommended admit for observation.        * No surgery found *      Hospital Course:   Pt admitted with foreign body, d/c by surgery after removal, will f/u with PCP and with DR. Israel       Goals of Care Treatment Preferences:  Code Status: Full Code      Consults:     No new Assessment & Plan notes have been filed under this hospital service since the last note was generated.  Service: Hospital Medicine    Final Active Diagnoses:    Diagnosis Date Noted POA    PRINCIPAL PROBLEM:  Foreign body in throat [T17.208A] 06/26/2023 Yes    Esophageal obstruction due to food impaction [K22.2, T18.128A] 06/26/2023 Yes      Problems Resolved During this  "Admission:       Discharged Condition: good    Disposition: Home or Self Care    Follow Up:    Patient Instructions:      Diet general       Significant Diagnostic Studies: Labs: BMP: No results for input(s): "GLU", "NA", "K", "CL", "CO2", "BUN", "CREATININE", "CALCIUM", "MG" in the last 48 hours. and CMP No results for input(s): "NA", "K", "CL", "CO2", "GLU", "BUN", "CREATININE", "CALCIUM", "PROT", "ALBUMIN", "BILITOT", "ALKPHOS", "AST", "ALT", "ANIONGAP", "ESTGFRAFRICA", "EGFRNONAA" in the last 48 hours.    Pending Diagnostic Studies:     Procedure Component Value Units Date/Time    Specimen to Pathology [[id number]] Collected: 23    Order Status: Sent Lab Status: In process Updated: 23    Specimen: Tissue from Antrum     Specimen to Pathology Gastrointestinal tract [[id number]] Collected: 23    Order Status: Sent Lab Status: No result     Specimen: Tissue          Medications:  Reconciled Home Medications:      Medication List      CONTINUE taking these medications    pantoprazole 40 MG tablet  Commonly known as: PROTONIX  Take 40 mg by mouth once daily.     prednisoLONE acetate 1 % Drps  Commonly known as: PRED FORTE  SMARTSI Drop(s) Left Eye 6 Times Daily            Indwelling Lines/Drains at time of discharge:   Lines/Drains/Airways     None                 Time spent on the discharge of patient: 36 minutes         Ramila Izaguirre MD  Department of Hospital Medicine  [facility]  "

## 2023-08-05 NOTE — HOSPITAL COURSE
Pt admitted with foreign body, d/c by surgery after removal, will f/u with PCP and with DR. Israel

## 2024-10-16 ENCOUNTER — HOSPITAL ENCOUNTER (EMERGENCY)
Facility: HOSPITAL | Age: 74
Discharge: HOME | End: 2024-10-16
Payer: MEDICARE

## 2024-10-16 VITALS
DIASTOLIC BLOOD PRESSURE: 86 MMHG | SYSTOLIC BLOOD PRESSURE: 154 MMHG | RESPIRATION RATE: 18 BRPM | WEIGHT: 165 LBS | TEMPERATURE: 98 F | OXYGEN SATURATION: 95 % | HEART RATE: 87 BPM | BODY MASS INDEX: 26.52 KG/M2 | HEIGHT: 66 IN

## 2024-10-16 DIAGNOSIS — S39.94XA: Primary | ICD-10-CM

## 2024-10-16 LAB
BACTERIA #/AREA URNS AUTO: ABNORMAL /HPF
BILIRUB UR QL STRIP.AUTO: NEGATIVE
CLARITY UR: CLEAR
COLOR UR AUTO: YELLOW
GLUCOSE UR QL STRIP: NEGATIVE
HGB UR QL STRIP: ABNORMAL
KETONES UR QL STRIP: NEGATIVE
LEUKOCYTE ESTERASE UR QL STRIP: NEGATIVE
NITRITE UR QL STRIP: NEGATIVE
PH UR STRIP: 6 [PH]
PROT UR QL STRIP: NEGATIVE
RBC #/AREA URNS AUTO: ABNORMAL /HPF
SP GR UR STRIP.AUTO: 1.01 (ref 1–1.03)
SQUAMOUS #/AREA URNS AUTO: ABNORMAL /HPF
UROBILINOGEN UR STRIP-ACNC: 0.2
WBC #/AREA URNS AUTO: ABNORMAL /HPF

## 2024-10-16 PROCEDURE — 99282 EMERGENCY DEPT VISIT SF MDM: CPT

## 2024-10-16 PROCEDURE — 81003 URINALYSIS AUTO W/O SCOPE: CPT

## 2024-10-16 PROCEDURE — 81015 MICROSCOPIC EXAM OF URINE: CPT

## 2024-10-17 NOTE — ED PROVIDER NOTES
Encounter Date: 10/16/2024       History     Chief Complaint   Patient presents with    Hematuria     Reports that he urinated before getting into the shower and while he was showering he noticed blood coming from his penis. Denies any pay or burning or discomfort.      74-year-old male presents complaining of seeing some blood when he was getting into the shower.  The blood appeared to be coming from his penis.  When he got in the shower, he noticed that he had a small cut on his penis.  He does not remember a specific injury.    The history is provided by the patient.     Review of patient's allergies indicates:  No Known Allergies  Past Medical History:   Diagnosis Date    Gastro-esophageal reflux disease without esophagitis     Unspecified cataract     Unspecified glaucoma      Past Surgical History:   Procedure Laterality Date    APPENDECTOMY      eye shunts Left     EYE SURGERY Left     x4    PENETRATING KERATOPLASTY Left 11/22/2022    Procedure: KERATOPLASTY, PENETRATING;  Surgeon: Alexis Gaytan MD;  Location: [facility];  Service: Ophthalmology;  Laterality: Left;    TRANSPLANT, PARTIAL-THICKNESS, CORNEA, USING DSAEK TECHNIQUE       No family history on file.  Social History     Tobacco Use    Smoking status: Never    Smokeless tobacco: Current     Types: Chew   Substance Use Topics    Alcohol use: Yes     Alcohol/week: 24.0 standard drinks of alcohol     Types: 24 Cans of beer per week    Drug use: Never     Review of Systems   Constitutional:  Negative for fever.   HENT:  Negative for sore throat.    Respiratory:  Negative for shortness of breath.    Cardiovascular:  Negative for chest pain.   Gastrointestinal:  Negative for nausea.   Genitourinary:  Negative for dysuria.   Musculoskeletal:  Negative for back pain.   Skin:  Positive for wound. Negative for rash.   Neurological:  Negative for weakness.   Hematological:  Does not bruise/bleed easily.   All other systems reviewed and are negative.      Physical  Exam     Initial Vitals [10/16/24 1939]   BP Pulse Resp Temp SpO2   (!) 154/86 87 18 98 °F (36.7 °C) 95 %      MAP       --         Physical Exam    Nursing note and vitals reviewed.  Constitutional: Vital signs are normal. He appears well-developed and well-nourished. He is cooperative.   HENT:   Head: Normocephalic and atraumatic. Mouth/Throat: Oropharynx is clear and moist.   Eyes: Conjunctivae, EOM and lids are normal. Pupils are equal, round, and reactive to light.   Neck: Trachea normal. Neck supple.   Normal range of motion.  Cardiovascular:  Normal rate, regular rhythm, normal heart sounds and intact distal pulses.           Pulmonary/Chest: Breath sounds normal.   Abdominal: Abdomen is soft. Bowel sounds are normal.   Genitourinary:    Genitourinary Comments: There has a superficial abrasion/laceration on both the glans and shaft of the penis.  It appears this may have been cut by a zipper.     Musculoskeletal:      Cervical back: Normal, normal range of motion and neck supple.      Lumbar back: Normal.     Neurological: He is alert and oriented to person, place, and time. He has normal strength. Coordination normal. GCS score is 15. GCS eye subscore is 4. GCS verbal subscore is 5. GCS motor subscore is 6.   Skin: Skin is warm, dry and intact. Capillary refill takes less than 2 seconds.   Psychiatric: He has a normal mood and affect. His speech is normal and behavior is normal. Judgment and thought content normal. Cognition and memory are normal.         ED Course   Procedures  Labs Reviewed   URINALYSIS, REFLEX TO URINE CULTURE - Abnormal       Result Value    Color, UA Yellow      Appearance, UA Clear      Specific Gravity, UA 1.015      pH, UA 6.0      Protein, UA Negative      Glucose, UA Negative      Ketones, UA Negative      Blood, UA Large (*)     Bilirubin, UA Negative      Urobilinogen, UA 0.2      Nitrites, UA Negative      Leukocyte Esterase, UA Negative      Narrative:      URINE STABILITY IS 2  HOURS AT ROOM TEMP OR    SIX HOURS REFRIGERATED. PERFORMING TESTING ON    SPECIMENS GREATER THAN THIS AGE MAY AFFECT THE    FOLLOWING TESTS:    PH          SPECIFIC GRAVITY           BLOOD    CLARITY     BILIRUBIN               UROBILINOGEN   URINALYSIS, MICROSCOPIC - Abnormal    Bacteria, UA None Seen      RBC, UA 50-99 (*)     WBC, UA 0-2      Squamous Epithelial Cells, UA None Seen            Imaging Results    None          Medications - No data to display  Medical Decision Making  Apparent zipper injury to the penis.  Urinalysis has already been collected                                      Clinical Impression:  Final diagnoses:  [S39.94XA] Injury of penis due to zipper (Primary)          ED Disposition Condition    Discharge Good          ED Prescriptions    None       Follow-up Information       Follow up With Specialties Details Why Contact Info    Arnol Lewis MD Internal Medicine Call in 1 day  [address] [facility] [address]  [phone]               Yrn oRblero MD  10/16/24 2046       Yrn Roblero MD  10/16/24 2052

## 2024-10-23 ENCOUNTER — HOSPITAL ENCOUNTER (OUTPATIENT)
Dept: RADIOLOGY | Facility: HOSPITAL | Age: 74
Discharge: HOME | End: 2024-10-23
Attending: INTERNAL MEDICINE
Payer: MEDICARE

## 2024-10-23 DIAGNOSIS — N50.819 TESTICULAR PAIN: ICD-10-CM

## 2024-10-23 PROCEDURE — 76870 US EXAM SCROTUM: CPT | Mod: TC

## 2024-12-27 ENCOUNTER — LAB REQUISITION (OUTPATIENT)
Dept: LAB | Facility: HOSPITAL | Age: 74
End: 2024-12-27
Payer: MEDICARE

## 2024-12-27 DIAGNOSIS — R31.0 GROSS HEMATURIA: ICD-10-CM

## 2024-12-27 LAB
ANION GAP SERPL CALC-SCNC: 8 MEQ/L
BUN SERPL-MCNC: 13 MG/DL (ref 8.4–25.7)
CALCIUM SERPL-MCNC: 9.5 MG/DL (ref 8.8–10)
CHLORIDE SERPL-SCNC: 106 MMOL/L (ref 98–107)
CO2 SERPL-SCNC: 28 MMOL/L (ref 23–31)
CREAT SERPL-MCNC: 1.04 MG/DL (ref 0.72–1.25)
CREAT/UREA NIT SERPL: 13
GFR SERPLBLD CREATININE-BSD FMLA CKD-EPI: >60 ML/MIN/1.73/M2
GLUCOSE SERPL-MCNC: 105 MG/DL (ref 82–115)
POTASSIUM SERPL-SCNC: 4.7 MMOL/L (ref 3.5–5.1)
SODIUM SERPL-SCNC: 142 MMOL/L (ref 136–145)

## 2024-12-27 PROCEDURE — 80048 BASIC METABOLIC PNL TOTAL CA: CPT

## 2025-01-08 DIAGNOSIS — R31.0 GROSS HEMATURIA: Primary | ICD-10-CM

## 2025-01-15 ENCOUNTER — HOSPITAL ENCOUNTER (OUTPATIENT)
Dept: RADIOLOGY | Facility: HOSPITAL | Age: 75
Discharge: HOME | End: 2025-01-15
Payer: MEDICARE

## 2025-01-15 DIAGNOSIS — R31.0 GROSS HEMATURIA: ICD-10-CM

## 2025-01-15 PROCEDURE — 74178 CT ABD&PLV WO CNTR FLWD CNTR: CPT | Mod: TC

## 2025-01-15 PROCEDURE — 25500020 PHARM REV CODE 255

## 2025-01-15 RX ADMIN — IOHEXOL 100 ML: 300 INJECTION, SOLUTION INTRAVENOUS at 08:01

## 2025-03-07 ENCOUNTER — HOSPITAL ENCOUNTER (EMERGENCY)
Facility: HOSPITAL | Age: 75
Discharge: HOME | End: 2025-03-07
Attending: FAMILY MEDICINE
Payer: MEDICARE

## 2025-03-07 VITALS
HEIGHT: 66 IN | BODY MASS INDEX: 29.03 KG/M2 | DIASTOLIC BLOOD PRESSURE: 86 MMHG | TEMPERATURE: 98 F | WEIGHT: 180.63 LBS | RESPIRATION RATE: 18 BRPM | SYSTOLIC BLOOD PRESSURE: 139 MMHG | OXYGEN SATURATION: 99 % | HEART RATE: 59 BPM

## 2025-03-07 DIAGNOSIS — M54.41 ACUTE RIGHT-SIDED LOW BACK PAIN WITH RIGHT-SIDED SCIATICA: Primary | ICD-10-CM

## 2025-03-07 PROCEDURE — 63600175 PHARM REV CODE 636 W HCPCS: Mod: JZ,TB | Performed by: FAMILY MEDICINE

## 2025-03-07 PROCEDURE — 96372 THER/PROPH/DIAG INJ SC/IM: CPT | Performed by: FAMILY MEDICINE

## 2025-03-07 PROCEDURE — 99284 EMERGENCY DEPT VISIT MOD MDM: CPT | Mod: 25

## 2025-03-07 PROCEDURE — 25000003 PHARM REV CODE 250: Performed by: FAMILY MEDICINE

## 2025-03-07 RX ORDER — HYDROCODONE BITARTRATE AND ACETAMINOPHEN 5; 325 MG/1; MG/1
1 TABLET ORAL
Refills: 0 | Status: COMPLETED | OUTPATIENT
Start: 2025-03-07 | End: 2025-03-07

## 2025-03-07 RX ORDER — KETOROLAC TROMETHAMINE 30 MG/ML
60 INJECTION, SOLUTION INTRAMUSCULAR; INTRAVENOUS
Status: COMPLETED | OUTPATIENT
Start: 2025-03-07 | End: 2025-03-07

## 2025-03-07 RX ORDER — DICLOFENAC SODIUM 100 MG/1
100 TABLET, EXTENDED RELEASE ORAL DAILY
Qty: 7 TABLET | Refills: 0 | Status: SHIPPED | OUTPATIENT
Start: 2025-03-07 | End: 2025-03-14

## 2025-03-07 RX ORDER — TRAMADOL HYDROCHLORIDE 50 MG/1
50 TABLET ORAL EVERY 6 HOURS PRN
Qty: 12 TABLET | Refills: 0 | Status: SHIPPED | OUTPATIENT
Start: 2025-03-07

## 2025-03-07 RX ORDER — DEXAMETHASONE SODIUM PHOSPHATE 4 MG/ML
8 INJECTION, SOLUTION INTRA-ARTICULAR; INTRALESIONAL; INTRAMUSCULAR; INTRAVENOUS; SOFT TISSUE
Status: COMPLETED | OUTPATIENT
Start: 2025-03-07 | End: 2025-03-07

## 2025-03-07 RX ORDER — METHYLPREDNISOLONE 4 MG/1
TABLET ORAL
Qty: 1 EACH | Refills: 0 | Status: SHIPPED | OUTPATIENT
Start: 2025-03-07 | End: 2025-03-28

## 2025-03-07 RX ADMIN — KETOROLAC TROMETHAMINE 60 MG: 30 INJECTION, SOLUTION INTRAMUSCULAR at 08:03

## 2025-03-07 RX ADMIN — HYDROCODONE BITARTRATE AND ACETAMINOPHEN 1 TABLET: 5; 325 TABLET ORAL at 08:03

## 2025-03-07 RX ADMIN — DEXAMETHASONE SODIUM PHOSPHATE 8 MG: 4 INJECTION, SOLUTION INTRA-ARTICULAR; INTRALESIONAL; INTRAMUSCULAR; INTRAVENOUS; SOFT TISSUE at 08:03

## 2025-03-07 NOTE — ED PROVIDER NOTES
Encounter Date: 3/7/2025       History     Chief Complaint   Patient presents with    Back Pain     Pt presented to ED per EMS with c/o lower back pain onset one week. Pt reports hx of sciatic nerve pain.      History of atrial back pain.  He has had a recent CT of the abdomen and pelvis showing degenerative changes of the spine.  The back pain is improved he now has positionally aggravated pain they goes from the lumbar region down to the upper part of the leg.  No weakness or numbness no bowel or bladder involvement.  It is positionally improved.  Negative urinary review of systems.        Review of patient's allergies indicates:  No Known Allergies  Past Medical History:   Diagnosis Date    Enlarged prostate     Gastro-esophageal reflux disease without esophagitis     Thyroid disease     Unspecified cataract     Unspecified glaucoma      Past Surgical History:   Procedure Laterality Date    APPENDECTOMY      eye shunts Left     EYE SURGERY Left     x4    PENETRATING KERATOPLASTY Left 11/22/2022    Procedure: KERATOPLASTY, PENETRATING;  Surgeon: Alexis Gaytan MD;  Location: [facility] OR;  Service: Ophthalmology;  Laterality: Left;    TRANSPLANT, PARTIAL-THICKNESS, CORNEA, USING DSAEK TECHNIQUE       No family history on file.  Social History[1]  Review of Systems   Constitutional: Negative.    Respiratory: Negative.     Cardiovascular: Negative.    Musculoskeletal:  Positive for back pain.   Neurological: Negative.        Physical Exam     Initial Vitals [03/07/25 0737]   BP Pulse Resp Temp SpO2   (!) 169/89 68 18 97.6 °F (36.4 °C) 98 %      MAP       --         Physical Exam    Constitutional: He appears well-developed and well-nourished.   Cardiovascular:  Normal rate, regular rhythm and normal heart sounds.           Pulmonary/Chest: Breath sounds normal.   Abdominal: Abdomen is soft. Bowel sounds are normal.   Musculoskeletal:      Comments: There is right lower lateral lumbar discomfort extending into the gluteus  and going down the upper leg.  It is worse with range of motion improved with positioning with the patient.  Distal sensation is intact patella is 2+ dorsalis pedis pulses 2+     Neurological: He is alert and oriented to person, place, and time. He displays normal reflexes.   Skin: Skin is warm and dry.         ED Course   Procedures  Labs Reviewed - No data to display       Imaging Results    None          Medications   ketorolac injection 60 mg (60 mg Intramuscular Given 3/7/25 0803)   HYDROcodone-acetaminophen 5-325 mg per tablet 1 tablet (1 tablet Oral Given 3/7/25 0804)   dexAMETHasone injection 8 mg (8 mg Intramuscular Given 3/7/25 0803)     Medical Decision Making  Risk  Prescription drug management.      Additional MDM:   Differential Diagnosis:   Intervertebral disc disease, sciatica, osteoarthritis, nerve impingement                                    Clinical Impression:  Final diagnoses:  [M54.41] Acute right-sided low back pain with right-sided sciatica (Primary)          ED Disposition Condition    Discharge Stable          ED Prescriptions       Medication Sig Dispense Start Date End Date Auth. Provider    diclofenac sodium 100 mg 24 hr tablet Take 100 mg by mouth once daily. for 7 days 7 tablet 3/7/2025 3/14/2025 James Chun MD    methylPREDNISolone (MEDROL DOSEPACK) 4 mg tablet Take as per package directions 1 each 3/7/2025 3/28/2025 James Chun MD    traMADoL (ULTRAM) 50 mg tablet Take 1 tablet (50 mg total) by mouth every 6 (six) hours as needed for Pain. 12 tablet 3/7/2025 -- James Chun MD          Follow-up Information       Follow up With Specialties Details Why Contact Info    Arnol Lewis MD Internal Medicine In 2 days  [address] [facility] [address]  [phone]                 [1]   Social History  Tobacco Use    Smoking status: Never    Smokeless tobacco: Current     Types: Chew   Substance Use Topics    Alcohol use: Yes     Alcohol/week: 24.0  standard drinks of alcohol     Types: 24 Cans of beer per week    Drug use: Never        James Chun MD  03/07/25 0876

## 2025-05-07 ENCOUNTER — HOSPITAL ENCOUNTER (OUTPATIENT)
Dept: PREADMISSION TESTING | Facility: HOSPITAL | Age: 75
Discharge: HOME | End: 2025-05-07
Attending: INTERNAL MEDICINE
Payer: MEDICARE

## 2025-05-07 VITALS — BODY MASS INDEX: 27.32 KG/M2 | HEIGHT: 66 IN | WEIGHT: 170 LBS

## 2025-05-07 DIAGNOSIS — Z12.11 SCREENING FOR COLON CANCER: ICD-10-CM

## 2025-05-07 DIAGNOSIS — Z12.11 COLON CANCER SCREENING: Primary | ICD-10-CM

## 2025-05-07 RX ORDER — TIMOLOL MALEATE 5 MG/ML
1 SOLUTION/ DROPS OPHTHALMIC EVERY MORNING
COMMUNITY
Start: 2025-04-25

## 2025-05-07 RX ORDER — LEVOTHYROXINE SODIUM 25 UG/1
25 TABLET ORAL
COMMUNITY
Start: 2024-11-21

## 2025-05-07 RX ORDER — FLUOROURACIL 50 MG/G
CREAM TOPICAL 2 TIMES DAILY
COMMUNITY

## 2025-05-07 RX ORDER — SODIUM CHLORIDE, SODIUM LACTATE, POTASSIUM CHLORIDE, CALCIUM CHLORIDE 600; 310; 30; 20 MG/100ML; MG/100ML; MG/100ML; MG/100ML
INJECTION, SOLUTION INTRAVENOUS CONTINUOUS
OUTPATIENT
Start: 2025-05-07

## 2025-05-07 RX ORDER — TAMSULOSIN HYDROCHLORIDE 0.4 MG/1
1 CAPSULE ORAL DAILY
COMMUNITY
Start: 2024-12-27

## 2025-05-07 NOTE — DISCHARGE INSTRUCTIONS

## 2025-05-13 ENCOUNTER — ANESTHESIA EVENT (OUTPATIENT)
Dept: GASTROENTEROLOGY | Facility: HOSPITAL | Age: 75
End: 2025-05-13
Payer: MEDICARE

## 2025-05-13 NOTE — ANESTHESIA PREPROCEDURE EVALUATION
05/13/2025  Dion Powers Sr. is a 74 y.o., male presents for colonoscopy          Anesthesia History    No specialty history recorded    Other Medical History   Unspecified glaucoma Unspecified cataract   Gastro-esophageal reflux disease without esophagitis Thyroid disease   Enlarged prostate        Surgical History    APPENDECTOMY TRANSPLANT, PARTIAL-THICKNESS, CORNEA, USING DSAEK TECHNIQUE   EYE SURGERY eye shunts   PENETRATING KERATOPLASTY          Prescription Medications  Within last 14 days from 05/13/25   Last Taken Last Updated   fluorouraciL (EFUDEX) 5 % cream        levothyroxine (SYNTHROID) 25 MCG tablet        pantoprazole (PROTONIX) 40 MG tablet    6/25/2023 06/26/23 1243   prednisoLONE acetate (PRED FORTE) 1 % DrpS    6/26/2023 at 0700 06/26/23 1243   tamsulosin (FLOMAX) 0.4 mg Cap        timolol maleate 0.5% (TIMOPTIC) 0.5 % Drop        traMADoL (ULTRAM) 50 mg tablet                Pre-op Assessment    I have reviewed the Patient Summary Reports.     I have reviewed the Nursing Notes. I have reviewed the NPO Status.   I have reviewed the Medications.     Review of Systems  Anesthesia Hx:  No problems with previous Anesthesia             Denies Family Hx of Anesthesia complications.    Denies Personal Hx of Anesthesia complications.                    Social:  Non-Smoker       Hematology/Oncology:  Hematology Normal   Oncology Normal                                   EENT/Dental:  EENT/Dental Normal           Cardiovascular:  Cardiovascular Normal                                              Pulmonary:  Pulmonary Normal                       Renal/:  Renal/ Normal                 Hepatic/GI:     GERD         Gerd          Musculoskeletal:  Musculoskeletal Normal                Neurological:  Neurology Normal                                      Endocrine:   Hypothyroidism           Dermatological:  Skin Normal    Psych:  Psychiatric Normal                    Physical Exam  General: Well nourished, Cooperative, Alert and Oriented    Airway:  Mallampati: II   Mouth Opening: Normal  TM Distance: Normal  Tongue: Normal  Neck ROM: Normal ROM        Anesthesia Plan  Type of Anesthesia, risks & benefits discussed:    Anesthesia Type: MAC  Intra-op Monitoring Plan: Standard ASA Monitors  Post Op Pain Control Plan:   (medical reason for not using multimodal pain management)  Induction:  IV  Airway Plan: , Post-Induction  Informed Consent: Informed consent signed with the Patient and all parties understand the risks and agree with anesthesia plan.  All questions answered. Patient consented to blood products? Yes  ASA Score: 2  Day of Surgery Review of History & Physical: H&P Update referred to the surgeon/provider.I have interviewed and examined the patient. I have reviewed the patient's H&P dated: There are no significant changes.     Ready For Surgery From Anesthesia Perspective.     .

## 2025-05-13 NOTE — PROGRESS NOTES
[facility]    History & Physical      Patient Name: Dion Powers Sr.  MRN: [id number]  Admission Date: (Not on file)  Attending Physician: Arnol Lewis MD  Primary Care Provider: Arnol Lewis MD             Subjective:         Chief Complaint:  Here for a colonoscopy       HPI:  Patient is a 74-year-old male who presents today for screening colonoscopy.  He denies any melena or hematochezia.    Past Medical History:   Diagnosis Date    Enlarged prostate     Gastro-esophageal reflux disease without esophagitis     Thyroid disease     Unspecified cataract     Unspecified glaucoma        Past Surgical History:   Procedure Laterality Date    APPENDECTOMY      eye shunts Left     EYE SURGERY Left     x4    PENETRATING KERATOPLASTY Left 2022    Procedure: KERATOPLASTY, PENETRATING;  Surgeon: Alexis Gaytan MD;  Location: [facility];  Service: Ophthalmology;  Laterality: Left;    TRANSPLANT, PARTIAL-THICKNESS, CORNEA, USING DSAEK TECHNIQUE         Review of patient's allergies indicates:  No Known Allergies    Current Outpatient Medications on File Prior to Visit   Medication Sig    fluorouraciL (EFUDEX) 5 % cream Apply topically 2 (two) times daily.    levothyroxine (SYNTHROID) 25 MCG tablet Take 25 mcg by mouth before breakfast.    pantoprazole (PROTONIX) 40 MG tablet Take 40 mg by mouth once daily.    prednisoLONE acetate (PRED FORTE) 1 % DrpS SMARTSI Drop(s) Left Eye 6 Times Daily    tamsulosin (FLOMAX) 0.4 mg Cap Take 1 capsule by mouth once daily.    timolol maleate 0.5% (TIMOPTIC) 0.5 % Drop Place 1 drop into the left eye every morning.    traMADoL (ULTRAM) 50 mg tablet Take 1 tablet (50 mg total) by mouth every 6 (six) hours as needed for Pain.     No current facility-administered medications on file prior to visit.     Family History    None       Tobacco Use    Smoking status: Never    Smokeless tobacco: Former     Types: Chew   Substance and Sexual Activity     Alcohol use: Yes     Alcohol/week: 24.0 standard drinks of alcohol     Types: 24 Cans of beer per week    Drug use: Never    Sexual activity: Yes     Review of Systems   All other systems reviewed and are negative.  Objective:     Vital Signs (Most Recent):    Vital Signs (24h Range):           There is no height or weight on file to calculate BMI.    Physical Exam  Constitutional:       Appearance: Normal appearance.   HENT:      Head: Normocephalic and atraumatic.      Nose: Nose normal.      Mouth/Throat:      Mouth: Mucous membranes are moist.   Eyes:      Extraocular Movements: Extraocular movements intact.      Pupils: Pupils are equal, round, and reactive to light.   Cardiovascular:      Rate and Rhythm: Normal rate and regular rhythm.      Pulses: Normal pulses.      Heart sounds: Normal heart sounds.   Pulmonary:      Effort: Pulmonary effort is normal.      Breath sounds: Normal breath sounds.   Abdominal:      General: Abdomen is flat.      Palpations: Abdomen is soft.   Musculoskeletal:         General: Normal range of motion.      Cervical back: Normal range of motion and neck supple.   Skin:     General: Skin is warm.   Neurological:      General: No focal deficit present.      Mental Status: He is alert and oriented to person, place, and time.   Psychiatric:         Mood and Affect: Mood normal.         Behavior: Behavior normal.       CRANIAL NERVES     CN III, IV, VI   Pupils are equal, round, and reactive to light.        Assessment/Plan:     Impression:  74-year-old male here for a screening colonoscopy    Plan:  Proceed with screening colonoscopy    VTE Risk Mitigation (From admission, onward)      None              Arnol Lewis MD  Department of [facility] Medicine   [facility]

## 2025-05-14 ENCOUNTER — ANESTHESIA (OUTPATIENT)
Dept: GASTROENTEROLOGY | Facility: HOSPITAL | Age: 75
End: 2025-05-14
Payer: MEDICARE

## 2025-05-14 ENCOUNTER — HOSPITAL ENCOUNTER (OUTPATIENT)
Dept: GASTROENTEROLOGY | Facility: HOSPITAL | Age: 75
Discharge: HOME | End: 2025-05-14
Attending: INTERNAL MEDICINE
Payer: MEDICARE

## 2025-05-14 VITALS
DIASTOLIC BLOOD PRESSURE: 71 MMHG | HEIGHT: 66 IN | TEMPERATURE: 98 F | WEIGHT: 170 LBS | BODY MASS INDEX: 27.32 KG/M2 | OXYGEN SATURATION: 98 % | HEART RATE: 56 BPM | RESPIRATION RATE: 18 BRPM | SYSTOLIC BLOOD PRESSURE: 144 MMHG

## 2025-05-14 DIAGNOSIS — Z12.11 COLON CANCER SCREENING: ICD-10-CM

## 2025-05-14 DIAGNOSIS — Z12.11 SCREENING FOR COLON CANCER: ICD-10-CM

## 2025-05-14 PROCEDURE — 63600175 PHARM REV CODE 636 W HCPCS: Performed by: INTERNAL MEDICINE

## 2025-05-14 PROCEDURE — 37000009 HC ANESTHESIA EA ADD 15 MINS

## 2025-05-14 PROCEDURE — 37000008 HC ANESTHESIA 1ST 15 MINUTES

## 2025-05-14 PROCEDURE — 63600175 PHARM REV CODE 636 W HCPCS: Performed by: NURSE ANESTHETIST, CERTIFIED REGISTERED

## 2025-05-14 RX ORDER — LIDOCAINE HYDROCHLORIDE 20 MG/ML
INJECTION INTRAVENOUS
Status: DISCONTINUED | OUTPATIENT
Start: 2025-05-14 | End: 2025-05-14

## 2025-05-14 RX ORDER — PROPOFOL 10 MG/ML
VIAL (ML) INTRAVENOUS
Status: DISCONTINUED | OUTPATIENT
Start: 2025-05-14 | End: 2025-05-14

## 2025-05-14 RX ORDER — SODIUM CHLORIDE, SODIUM LACTATE, POTASSIUM CHLORIDE, CALCIUM CHLORIDE 600; 310; 30; 20 MG/100ML; MG/100ML; MG/100ML; MG/100ML
INJECTION, SOLUTION INTRAVENOUS CONTINUOUS
Status: DISCONTINUED | OUTPATIENT
Start: 2025-05-14 | End: 2025-05-15 | Stop reason: HOSPADM

## 2025-05-14 RX ADMIN — PROPOFOL 80 MG: 10 INJECTION, EMULSION INTRAVENOUS at 07:05

## 2025-05-14 RX ADMIN — LIDOCAINE HYDROCHLORIDE 100 MG: 20 INJECTION, SOLUTION INTRAVENOUS at 07:05

## 2025-05-14 RX ADMIN — SODIUM CHLORIDE, POTASSIUM CHLORIDE, SODIUM LACTATE AND CALCIUM CHLORIDE: 600; 310; 30; 20 INJECTION, SOLUTION INTRAVENOUS at 06:05

## 2025-05-14 RX ADMIN — PROPOFOL 20 MG: 10 INJECTION, EMULSION INTRAVENOUS at 07:05

## 2025-05-14 NOTE — ANESTHESIA POSTPROCEDURE EVALUATION
Anesthesia Post Evaluation    Patient: Dion Powers     Procedure(s) Performed: * No procedures listed *    Final Anesthesia Type: MAC      Patient location during evaluation: floor  Patient participation: Yes- Able to Participate  Level of consciousness: awake and alert, awake and oriented  Post-procedure vital signs: reviewed and stable  Pain management: adequate  Airway patency: patent    PONV status at discharge: No PONV  Anesthetic complications: no      Cardiovascular status: blood pressure returned to baseline  Respiratory status: unassisted, room air and spontaneous ventilation  Hydration status: euvolemic  Follow-up not needed.              Vitals Value Taken Time   /80 05/14/25 06:04   Temp 36.3 °C (97.4 °F) 05/14/25 06:04   Pulse 68 05/14/25 06:04   Resp 18 05/14/25 06:04   SpO2 98 % 05/14/25 06:04         No case tracking events are documented in the log.      Pain/Shaun Score: No data recorded

## 2025-05-14 NOTE — OR NURSING
Patient is drinking water and tolerating it well, No s/s of distress noted, SR up x 2 with call bell in reach, Stable condition

## 2025-05-14 NOTE — DISCHARGE SUMMARY
[facility]  Discharge Note  Short Stay    Colonoscopy      OUTCOME: Patient tolerated treatment/procedure well without complication and is now ready for discharge.    DISPOSITION: Home or Self Care    FINAL DIAGNOSIS:  Mild Diverticulosis    FOLLOWUP: In clinic    DISCHARGE INSTRUCTIONS:  No discharge procedures on file.

## 2025-05-14 NOTE — H&P
[facility]     History & Physical        Patient Name: Dion Powers Sr.  MRN: [id number]  Admission Date: (Not on file)  Attending Physician: Arnol Lewis MD  Primary Care Provider: Arnol Lewis MD                 Subjective:            Chief Complaint:  Here for a colonoscopy        HPI:  Patient is a 74-year-old male who presents today for screening colonoscopy.  He denies any melena or hematochezia.          Past Medical History:   Diagnosis Date    Enlarged prostate      Gastro-esophageal reflux disease without esophagitis      Thyroid disease      Unspecified cataract      Unspecified glaucoma                 Past Surgical History:   Procedure Laterality Date    APPENDECTOMY        eye shunts Left      EYE SURGERY Left       x4    PENETRATING KERATOPLASTY Left 2022     Procedure: KERATOPLASTY, PENETRATING;  Surgeon: Alexis Gaytan MD;  Location: [facility];  Service: Ophthalmology;  Laterality: Left;    TRANSPLANT, PARTIAL-THICKNESS, CORNEA, USING DSAEK TECHNIQUE             Review of patient's allergies indicates:  No Known Allergies          Current Outpatient Medications on File Prior to Visit   Medication Sig    fluorouraciL (EFUDEX) 5 % cream Apply topically 2 (two) times daily.    levothyroxine (SYNTHROID) 25 MCG tablet Take 25 mcg by mouth before breakfast.    pantoprazole (PROTONIX) 40 MG tablet Take 40 mg by mouth once daily.    prednisoLONE acetate (PRED FORTE) 1 % DrpS SMARTSI Drop(s) Left Eye 6 Times Daily    tamsulosin (FLOMAX) 0.4 mg Cap Take 1 capsule by mouth once daily.    timolol maleate 0.5% (TIMOPTIC) 0.5 % Drop Place 1 drop into the left eye every morning.    traMADoL (ULTRAM) 50 mg tablet Take 1 tablet (50 mg total) by mouth every 6 (six) hours as needed for Pain.      No current facility-administered medications on file prior to visit.      Family History    None               Tobacco Use    Smoking status: Never    Smokeless tobacco:  Former       Types: Chew   Substance and Sexual Activity    Alcohol use: Yes       Alcohol/week: 24.0 standard drinks of alcohol       Types: 24 Cans of beer per week    Drug use: Never    Sexual activity: Yes      Review of Systems   All other systems reviewed and are negative.  Objective:      Vital Signs (Most Recent): Vital Signs (24h Range):      There is no height or weight on file to calculate BMI.     Physical Exam  Constitutional:       Appearance: Normal appearance.   HENT:      Head: Normocephalic and atraumatic.      Nose: Nose normal.      Mouth/Throat:      Mouth: Mucous membranes are moist.   Eyes:      Extraocular Movements: Extraocular movements intact.      Pupils: Pupils are equal, round, and reactive to light.   Cardiovascular:      Rate and Rhythm: Normal rate and regular rhythm.      Pulses: Normal pulses.      Heart sounds: Normal heart sounds.   Pulmonary:      Effort: Pulmonary effort is normal.      Breath sounds: Normal breath sounds.   Abdominal:      General: Abdomen is flat.      Palpations: Abdomen is soft.   Musculoskeletal:         General: Normal range of motion.      Cervical back: Normal range of motion and neck supple.   Skin:     General: Skin is warm.   Neurological:      General: No focal deficit present.      Mental Status: He is alert and oriented to person, place, and time.   Psychiatric:         Mood and Affect: Mood normal.         Behavior: Behavior normal.         CRANIAL NERVES      CN III, IV, VI   Pupils are equal, round, and reactive to light.           Assessment/Plan:      Impression:  74-year-old male here for a screening colonoscopy     Plan:  Proceed with screening colonoscopy     VTE Risk Mitigation (From admission, onward)        None                   Arnol Lewis MD  Department of [facility] Medicine   [facility]

## 2025-05-14 NOTE — OR NURSING
Patient is back to his room in stable condition, No difficulty breathing or swallowing, No s/s of distress noted, SR up x 2 with call bell in reach, Denies pain, Positive flatus,